# Patient Record
Sex: FEMALE | Race: WHITE | Employment: FULL TIME | URBAN - METROPOLITAN AREA
[De-identification: names, ages, dates, MRNs, and addresses within clinical notes are randomized per-mention and may not be internally consistent; named-entity substitution may affect disease eponyms.]

---

## 2017-06-06 ENCOUNTER — ALLSCRIPTS OFFICE VISIT (OUTPATIENT)
Dept: OTHER | Facility: OTHER | Age: 37
End: 2017-06-06

## 2017-06-06 DIAGNOSIS — J45.20 MILD INTERMITTENT ASTHMA, UNCOMPLICATED: ICD-10-CM

## 2017-08-03 ENCOUNTER — HOSPITAL ENCOUNTER (OUTPATIENT)
Dept: RADIOLOGY | Facility: HOSPITAL | Age: 37
Discharge: HOME | End: 2017-08-03
Attending: FAMILY MEDICINE
Payer: COMMERCIAL

## 2017-08-03 ENCOUNTER — TRANSCRIBE ORDERS (OUTPATIENT)
Dept: ADMINISTRATIVE | Facility: HOSPITAL | Age: 37
End: 2017-08-03

## 2017-08-03 DIAGNOSIS — J45.20 MILD INTERMITTENT ASTHMA, UNCOMPLICATED: ICD-10-CM

## 2017-08-03 PROCEDURE — 71020 HB CHEST X-RAY 2VW FRONTAL&LATL: CPT

## 2017-08-08 ENCOUNTER — GENERIC CONVERSION - ENCOUNTER (OUTPATIENT)
Dept: OTHER | Facility: OTHER | Age: 37
End: 2017-08-08

## 2017-08-11 ENCOUNTER — ALLSCRIPTS OFFICE VISIT (OUTPATIENT)
Dept: OTHER | Facility: OTHER | Age: 37
End: 2017-08-11

## 2017-08-11 ENCOUNTER — TRANSCRIBE ORDERS (OUTPATIENT)
Dept: ADMINISTRATIVE | Facility: HOSPITAL | Age: 37
End: 2017-08-11

## 2017-08-11 DIAGNOSIS — R06.02 SOB (SHORTNESS OF BREATH): Primary | ICD-10-CM

## 2017-08-11 DIAGNOSIS — R05.9 COUGH: ICD-10-CM

## 2017-08-17 ENCOUNTER — TRANSCRIBE ORDERS (OUTPATIENT)
Dept: SLEEP CENTER | Facility: CLINIC | Age: 37
End: 2017-08-17

## 2017-08-17 DIAGNOSIS — J44.9 OSA AND COPD OVERLAP SYNDROME (HCC): Primary | ICD-10-CM

## 2017-08-17 DIAGNOSIS — G47.33 OSA AND COPD OVERLAP SYNDROME (HCC): Primary | ICD-10-CM

## 2017-08-18 ENCOUNTER — TRANSCRIBE ORDERS (OUTPATIENT)
Dept: SLEEP CENTER | Facility: CLINIC | Age: 37
End: 2017-08-18

## 2017-08-18 DIAGNOSIS — G47.33 OSA (OBSTRUCTIVE SLEEP APNEA): Primary | ICD-10-CM

## 2017-08-29 ENCOUNTER — GENERIC CONVERSION - ENCOUNTER (OUTPATIENT)
Dept: OTHER | Facility: OTHER | Age: 37
End: 2017-08-29

## 2017-08-29 ENCOUNTER — HOSPITAL ENCOUNTER (OUTPATIENT)
Dept: PULMONOLOGY | Facility: HOSPITAL | Age: 37
Discharge: HOME | End: 2017-08-29
Attending: INTERNAL MEDICINE
Payer: COMMERCIAL

## 2017-08-29 DIAGNOSIS — R05.9 COUGH: ICD-10-CM

## 2017-08-29 DIAGNOSIS — R06.02 SOB (SHORTNESS OF BREATH): ICD-10-CM

## 2017-08-29 PROCEDURE — 94726 PLETHYSMOGRAPHY LUNG VOLUMES: CPT

## 2017-08-29 PROCEDURE — 94729 DIFFUSING CAPACITY: CPT

## 2017-08-29 PROCEDURE — 94760 N-INVAS EAR/PLS OXIMETRY 1: CPT

## 2017-08-29 PROCEDURE — 94070 EVALUATION OF WHEEZING: CPT

## 2017-08-29 RX ORDER — ALBUTEROL SULFATE 2.5 MG/3ML
2.5 SOLUTION RESPIRATORY (INHALATION) ONCE AS NEEDED
Status: DISCONTINUED | OUTPATIENT
Start: 2017-08-29 | End: 2017-09-02 | Stop reason: HOSPADM

## 2017-08-29 RX ORDER — ALBUTEROL SULFATE 2.5 MG/3ML
2.5 SOLUTION RESPIRATORY (INHALATION) ONCE AS NEEDED
Status: COMPLETED | OUTPATIENT
Start: 2017-08-29 | End: 2017-08-29

## 2017-08-29 RX ORDER — SODIUM CHLORIDE FOR INHALATION 0.9 %
3 VIAL, NEBULIZER (ML) INHALATION ONCE AS NEEDED
Status: COMPLETED | OUTPATIENT
Start: 2017-08-29 | End: 2017-08-29

## 2017-08-29 RX ADMIN — ALBUTEROL SULFATE 2.5 MG: 2.5 SOLUTION RESPIRATORY (INHALATION) at 13:32

## 2017-08-29 RX ADMIN — Medication 3 ML: at 13:29

## 2017-08-29 RX ADMIN — METHACHOLINE CHLORIDE 5 BREATH: 100 POWDER, FOR SOLUTION RESPIRATORY (INHALATION) at 13:27

## 2017-08-30 ENCOUNTER — ALLSCRIPTS OFFICE VISIT (OUTPATIENT)
Dept: OTHER | Facility: OTHER | Age: 37
End: 2017-08-30

## 2017-08-30 ENCOUNTER — GENERIC CONVERSION - ENCOUNTER (OUTPATIENT)
Dept: OTHER | Facility: OTHER | Age: 37
End: 2017-08-30

## 2017-12-18 ENCOUNTER — HOSPITAL ENCOUNTER (OUTPATIENT)
Dept: SLEEP CENTER | Facility: CLINIC | Age: 37
Discharge: HOME | End: 2017-12-18
Payer: COMMERCIAL

## 2017-12-18 DIAGNOSIS — G47.33 OSA (OBSTRUCTIVE SLEEP APNEA): ICD-10-CM

## 2017-12-18 DIAGNOSIS — G47.33 OSA AND COPD OVERLAP SYNDROME (HCC): ICD-10-CM

## 2017-12-18 DIAGNOSIS — J44.9 OSA AND COPD OVERLAP SYNDROME (HCC): ICD-10-CM

## 2017-12-18 PROCEDURE — G0399 HOME SLEEP TEST/TYPE 3 PORTA: HCPCS

## 2017-12-20 DIAGNOSIS — R51.9 HEADACHE: ICD-10-CM

## 2017-12-20 DIAGNOSIS — G47.19 OTHER HYPERSOMNIA: ICD-10-CM

## 2017-12-20 DIAGNOSIS — E04.9 NONTOXIC GOITER: ICD-10-CM

## 2017-12-20 DIAGNOSIS — G47.39 OTHER SLEEP APNEA: ICD-10-CM

## 2017-12-20 DIAGNOSIS — G43.709 CHRONIC MIGRAINE WITHOUT AURA WITHOUT STATUS MIGRAINOSUS, NOT INTRACTABLE: ICD-10-CM

## 2017-12-20 DIAGNOSIS — E66.9 OBESITY: ICD-10-CM

## 2018-01-09 ENCOUNTER — GENERIC CONVERSION - ENCOUNTER (OUTPATIENT)
Dept: OTHER | Facility: OTHER | Age: 38
End: 2018-01-09

## 2018-01-09 ENCOUNTER — ALLSCRIPTS OFFICE VISIT (OUTPATIENT)
Dept: OTHER | Facility: OTHER | Age: 38
End: 2018-01-09

## 2018-01-09 ENCOUNTER — TRANSCRIBE ORDERS (OUTPATIENT)
Dept: ADMINISTRATIVE | Facility: HOSPITAL | Age: 38
End: 2018-01-09

## 2018-01-09 DIAGNOSIS — G47.39 MIXED SLEEP APNEA: Primary | ICD-10-CM

## 2018-01-10 ENCOUNTER — GENERIC CONVERSION - ENCOUNTER (OUTPATIENT)
Dept: OTHER | Facility: OTHER | Age: 38
End: 2018-01-10

## 2018-01-10 ENCOUNTER — TRANSCRIBE ORDERS (OUTPATIENT)
Dept: ADMINISTRATIVE | Facility: HOSPITAL | Age: 38
End: 2018-01-10

## 2018-01-10 DIAGNOSIS — G47.33 OBSTRUCTIVE SLEEP APNEA SYNDROME: Primary | ICD-10-CM

## 2018-01-10 LAB
A/G RATIO (HISTORICAL): 1.6 (ref 1.2–2.2)
ALBUMIN SERPL BCP-MCNC: 4.7 G/DL (ref 3.5–5.5)
ALP SERPL-CCNC: 114 IU/L (ref 39–117)
ALT SERPL W P-5'-P-CCNC: 25 IU/L (ref 0–32)
ANTINUCLEAR ANTIBODIES, IFA (HISTORICAL): NEGATIVE
AST SERPL W P-5'-P-CCNC: 18 IU/L (ref 0–40)
BASOPHILS # BLD AUTO: 0 %
BASOPHILS # BLD AUTO: 0 X10E3/UL (ref 0–0.2)
BILIRUB SERPL-MCNC: 0.5 MG/DL (ref 0–1.2)
BUN SERPL-MCNC: 15 MG/DL (ref 6–20)
BUN/CREA RATIO (HISTORICAL): 22 (ref 9–23)
CALCIUM SERPL-MCNC: 9.5 MG/DL (ref 8.7–10.2)
CHLORIDE SERPL-SCNC: 101 MMOL/L (ref 96–106)
CHOLEST SERPL-MCNC: 222 MG/DL (ref 100–199)
CO2 SERPL-SCNC: 20 MMOL/L (ref 18–29)
CREAT SERPL-MCNC: 0.68 MG/DL (ref 0.57–1)
DEPRECATED RDW RBC AUTO: 15 % (ref 12.3–15.4)
EGFR AFRICAN AMERICAN (HISTORICAL): 130 ML/MIN/1.73
EGFR-AMERICAN CALC (HISTORICAL): 113 ML/MIN/1.73
EOSINOPHIL # BLD AUTO: 0.2 X10E3/UL (ref 0–0.4)
EOSINOPHIL # BLD AUTO: 2 %
ERYTHROCYTE SEDIMENTATION RATE (HISTORICAL): 45 MM/HR (ref 0–32)
GLUCOSE SERPL-MCNC: 96 MG/DL (ref 65–99)
HCT VFR BLD AUTO: 39.1 % (ref 34–46.6)
HDLC SERPL-MCNC: 53 MG/DL
HGB BLD-MCNC: 12.9 G/DL (ref 11.1–15.9)
IMM.GRANULOCYTES (CD4/8) (HISTORICAL): 0.1 X10E3/UL (ref 0–0.1)
IMM.GRANULOCYTES (CD4/8) (HISTORICAL): 1 %
LDLC SERPL CALC-MCNC: 122 MG/DL (ref 0–99)
LYME IGG/IGM AB (HISTORICAL): <0.91 ISR (ref 0–0.9)
LYMPHOCYTES # BLD AUTO: 2 X10E3/UL (ref 0.7–3.1)
LYMPHOCYTES # BLD AUTO: 25 %
MCH RBC QN AUTO: 27.6 PG (ref 26.6–33)
MCHC RBC AUTO-ENTMCNC: 33 G/DL (ref 31.5–35.7)
MCV RBC AUTO: 84 FL (ref 79–97)
MONOCYTES # BLD AUTO: 0.7 X10E3/UL (ref 0.1–0.9)
MONOCYTES (HISTORICAL): 8 %
NEUTROPHILS # BLD AUTO: 5 X10E3/UL (ref 1.4–7)
NEUTROPHILS # BLD AUTO: 64 %
PLATELET # BLD AUTO: 311 X10E3/UL (ref 150–379)
PLEASE NOTE (HISTORICAL): NORMAL
POTASSIUM SERPL-SCNC: 4.2 MMOL/L (ref 3.5–5.2)
RBC (HISTORICAL): 4.68 X10E6/UL (ref 3.77–5.28)
SODIUM SERPL-SCNC: 139 MMOL/L (ref 134–144)
TOT. GLOBULIN, SERUM (HISTORICAL): 3 G/DL (ref 1.5–4.5)
TOTAL PROTEIN (HISTORICAL): 7.7 G/DL (ref 6–8.5)
TRIGL SERPL-MCNC: 233 MG/DL (ref 0–149)
TSH SERPL DL<=0.05 MIU/L-ACNC: 2.75 UIU/ML (ref 0.45–4.5)
VLDLC SERPL CALC-MCNC: 47 MG/DL (ref 5–40)
WBC # BLD AUTO: 8 X10E3/UL (ref 3.4–10.8)

## 2018-01-11 ENCOUNTER — GENERIC CONVERSION - ENCOUNTER (OUTPATIENT)
Dept: OTHER | Facility: OTHER | Age: 38
End: 2018-01-11

## 2018-01-11 LAB — LEAD BLOOD (HISTORICAL): 1 UG/DL (ref 0–19)

## 2018-01-11 NOTE — MISCELLANEOUS
Message  Patient called the office earlier today to speak with some one regarding her SOB after having a Methacholine challenge performed yesterday  Patients phone call from earlier today was returned  Patient did not answer and a message was left  Signatures   Electronically signed by : CHATO Gutierrez;  Aug 30 2017  5:06PM EST                       (Author)

## 2018-01-12 ENCOUNTER — GENERIC CONVERSION - ENCOUNTER (OUTPATIENT)
Dept: OTHER | Facility: OTHER | Age: 38
End: 2018-01-12

## 2018-01-12 VITALS
HEART RATE: 82 BPM | HEIGHT: 64 IN | RESPIRATION RATE: 14 BRPM | OXYGEN SATURATION: 99 % | SYSTOLIC BLOOD PRESSURE: 128 MMHG | TEMPERATURE: 98 F | BODY MASS INDEX: 39.09 KG/M2 | WEIGHT: 229 LBS | DIASTOLIC BLOOD PRESSURE: 86 MMHG

## 2018-01-12 LAB
ADEQUACY: (HISTORICAL): NORMAL
CLINICIAN PROVIDIED ICD 9 OR 10 (HISTORICAL): NORMAL
COMMENT (HISTORICAL): NORMAL
DIAGNOSIS (HISTORICAL): NORMAL
HPV HIGH RISK (HISTORICAL): NEGATIVE
PERFORMED BY (HISTORICAL): NORMAL

## 2018-01-13 VITALS
OXYGEN SATURATION: 97 % | HEIGHT: 64 IN | BODY MASS INDEX: 38.41 KG/M2 | TEMPERATURE: 97.9 F | HEART RATE: 93 BPM | DIASTOLIC BLOOD PRESSURE: 90 MMHG | SYSTOLIC BLOOD PRESSURE: 124 MMHG | WEIGHT: 225 LBS

## 2018-01-14 VITALS
BODY MASS INDEX: 38.67 KG/M2 | OXYGEN SATURATION: 97 % | HEIGHT: 64 IN | HEART RATE: 105 BPM | SYSTOLIC BLOOD PRESSURE: 130 MMHG | TEMPERATURE: 100.2 F | WEIGHT: 226.5 LBS | DIASTOLIC BLOOD PRESSURE: 84 MMHG

## 2018-01-15 NOTE — RESULT NOTES
Discussion/Summary   Please let the patient know that her chest x-ray was normal      Verified Results  * XR CHEST PA & LATERAL [id number] 10:31AM [facility] Order Number: [id number]     Test Name Result Flag Reference   XR CHEST PA & LATERAL (Report)     CHEST DUAL ENERGY     INDICATION: Productive cough for 4 months  COMPARISON: None     VIEWS: PA (including soft tissue and bone algorithms) and lateral projections; 4 images     FINDINGS:        Cardiomediastinal silhouette appears unremarkable  The lungs are clear  No pneumothorax or pleural effusion  Visualized osseous structures appear within normal limits for the patient's age  IMPRESSION:     No active pulmonary disease  Workstation performed: [id number]     Signed by:    Ady Hernadnez MD   8/8/17

## 2018-01-18 NOTE — MISCELLANEOUS
Message   Date: 30 Aug 2017 2:09 PM EST, Recorded By: Brittney Rodriguez For: Nevaeh Dumas: Lenore Rudd, Self   Phone: [phone] (Home)   Reason: General Medical Question   PT Called here to let us know she had her PFT done and wanted results i told her results will take between 5 to 7 days for results she than said she feels more SOB i told her she should call her family Dr  and let them be aware  she is aware that Dr Babs Prabhakar is unavailable this week til 9/5/17        Active Problems    1  Allergic rhinitis (477 9) (J30 9)   2  Chronic migraine without aura without status migrainosus, not intractable (346 70)   (G43 709)   3  Cough (786 2) (R05)   4  Eczema (692 9) (L30 9)   5  Excessive daytime sleepiness (780 54) (G47 19)   6  Menstrual migraine without status migrainosus, not intractable (346 40) (G43 829)   7  Mild intermittent reactive airway disease without complication (091 31) (D06 08)   8  Obesity (BMI 30-39 9) (278 00) (E66 9)   9  Shortness of breath (786 05) (R06 02)   10  Sleep apnea with hypersomnolence (780 53) (G47 10,G47 30)   11  Snoring (786 09) (R06 83)   12  Thyromegaly (240 9) (E04 9)    Current Meds   1  Butalbital-APAP-Caffeine -40 MG Oral Capsule; TAKE 1 CAPSULE Every 4 hours   PRN severe migraine; Therapy: 25MSJ8427 to (Evaluate:08Jun2017); Last Rx:06Jun2017 Ordered   2  Fluticasone Propionate 50 MCG/ACT Nasal Suspension; USE TWO SPRAYS EACH   NOSTRIL DAILY; Therapy: 11Aug2017 to (Evaluate:56Tez9746); Last Rx:11Aug2017 Ordered    Allergies    1  No Known Food Allergies    2   No Known Allergies    Signatures   Electronically signed by : Sophie Chaudhari, ; Aug 30 2017  2:14PM EST                       (Author)

## 2018-01-22 VITALS
WEIGHT: 242 LBS | BODY MASS INDEX: 41.32 KG/M2 | TEMPERATURE: 98.4 F | SYSTOLIC BLOOD PRESSURE: 126 MMHG | DIASTOLIC BLOOD PRESSURE: 98 MMHG | HEART RATE: 92 BPM | HEIGHT: 64 IN | OXYGEN SATURATION: 97 %

## 2018-01-23 NOTE — RESULT NOTES
Verified Results  (1) CBC/PLT/DIFF [id number] 12:00AM [facility]     Test Name Result Flag Reference   WBC 8 0 x10E3/uL  3 4-10 8   RBC 4 68 x10E6/uL  3 77-5 28   Hemoglobin 12 9 g/dL  11 1-15 9   Hematocrit 39 1 %  34 0-46  6   MCV 84 fL  79-97   MCH 27 6 pg  26 6-33 0   MCHC 33 0 g/dL  31 5-35 7   RDW 15 0 %  12 3-15 4   Platelets 003 N09T6/HD  150-379   Neutrophils 64 %  Not Estab  Lymphs 25 %  Not Estab  Monocytes 8 %  Not Estab  Eos 2 %  Not Estab  Basos 0 %  Not Estab  Neutrophils (Absolute) 5 0 x10E3/uL  1 4-7 0   Lymphs (Absolute) 2 0 x10E3/uL  0 7-3 1   Monocytes(Absolute) 0 7 x10E3/uL  0 1-0 9   Eos (Absolute) 0 2 x10E3/uL  0 0-0 4   Baso (Absolute) 0 0 x10E3/uL  0 0-0 2   Immature Granulocytes 1 %  Not Estab  Immature Grans (Abs) 0 1 x10E3/uL  0 0-0 1     (LC) Please note [id number] 12:00AM [facility]     Test Name Result Flag Reference   Please note      The date and/or time of collection was not indicated on the  requisition as required by state and federal law  The date of  receipt of the specimen was used as the collection date if not  supplied  The date and/or time of collection was not indicated on the  requisition as required by state and federal law  The date of  receipt of the specimen was used as the collection date if not  supplied       (LC) TSH reflex to T4F [id number] 12:00AM [facility]     Test Name Result Flag Reference   TSH 2 750 uIU/mL  0 450-4 500       Plan  Mixed sleep apnea    · *[facility]  Status: Active  Requested for:  [id number]  Care Summary provided  : Yes

## 2018-01-23 NOTE — MISCELLANEOUS
Message  Called patient, discussed the results of her sleep study  Patient was made aware that she needs to have a second sleep study done  Appointment was made for the patient and she was made aware of time and date  Active Problems    1  Allergic rhinitis (477 9) (J30 9)   2  Bronchitis, acute (466 0) (J20 9)   3  Chronic headache (784 0) (R51)   4  Chronic migraine without aura without status migrainosus, not intractable (346 70)   (G43 709)   5  Cough (786 2) (R05)   6  Eczema (692 9) (L30 9)   7  Encounter for gynecological examination without abnormal finding (V72 31) (Z01 419)   8  Excessive daytime sleepiness (780 54) (G47 19)   9  IUD (intrauterine device) in place (V45 51) (Z97 5)   10  Menstrual migraine without status migrainosus, not intractable (346 40) (G43 829)   11  Mild intermittent reactive airway disease without complication (979 17) (K43 62)   12  Mixed sleep apnea (327 29) (G47 39)   13  Nausea (787 02) (R11 0)   14  Obesity (BMI 30-39 9) (278 00) (E66 9)   15  JOSH (obstructive sleep apnea) (327 23) (G47 33)   16  Screening for endocrine disorder (V77 99) (Z13 29)   17  Screening for lead exposure (V82 5) (Z13 88)   18  Screening, anemia, deficiency, iron (V78 0) (Z13 0)   19  Sleep apnea with hypersomnolence (780 53) (G47 10,G47 30)   20  Snoring (786 09) (R06 83)   21  Thyroid disorder screen (V77 0) (Z13 29)   22  Thyromegaly (240 9) (E04 9)    Current Meds   1  Butalbital-APAP-Caffeine -40 MG Oral Capsule; TAKE 1 CAPSULE Every 4 hours   PRN severe migraine; Therapy: 57MJB7432 to (Evaluate:08Jun2017); Last Rx:06Jun2017 Ordered   2  Fluticasone Propionate 50 MCG/ACT Nasal Suspension; USE TWO SPRAYS EACH   NOSTRIL DAILY; Therapy: 11Aug2017 to (Tomas Ashton)  Requested for: 03GFO7999; Last   Rx:09Jan2018 Ordered   3  ProAir  (90 Base) MCG/ACT Inhalation Aerosol Solution; INHALE 2 PUFFS   EVERY 4 HOURS AS NEEDED;    Therapy: 50Bko5482 to (Last Rx:51Gzh8498) Requested for: 88Amg0352 Ordered    Allergies    1  No Known Food Allergies    2   No Known Allergies    Signatures   Electronically signed by : Mirtha Hernandez, ; Keith 10 2018 10:36AM EST                       (Author)

## 2018-01-30 ENCOUNTER — HOSPITAL ENCOUNTER (OUTPATIENT)
Dept: NON INVASIVE DIAGNOSTICS | Facility: HOSPITAL | Age: 38
Discharge: HOME | End: 2018-01-30
Attending: FAMILY MEDICINE
Payer: COMMERCIAL

## 2018-01-30 DIAGNOSIS — G47.39 OTHER SLEEP APNEA: ICD-10-CM

## 2018-01-30 PROCEDURE — 93306 TTE W/DOPPLER COMPLETE: CPT | Performed by: INTERNAL MEDICINE

## 2018-01-30 PROCEDURE — 93306 TTE W/DOPPLER COMPLETE: CPT

## 2018-02-19 ENCOUNTER — HOSPITAL ENCOUNTER (OUTPATIENT)
Dept: SLEEP CENTER | Facility: HOSPITAL | Age: 38
Discharge: HOME | End: 2018-02-19
Attending: INTERNAL MEDICINE
Payer: COMMERCIAL

## 2018-02-19 DIAGNOSIS — G47.33 OBSTRUCTIVE SLEEP APNEA SYNDROME: ICD-10-CM

## 2018-02-19 DIAGNOSIS — G47.31 CSA (CENTRAL SLEEP APNEA): ICD-10-CM

## 2018-02-19 PROCEDURE — 95811 POLYSOM 6/>YRS CPAP 4/> PARM: CPT

## 2018-02-19 PROCEDURE — 95811 POLYSOM 6/>YRS CPAP 4/> PARM: CPT | Performed by: INTERNAL MEDICINE

## 2018-02-20 NOTE — PROGRESS NOTES
Sleep Study Documentation    Pre-Sleep Study       Sleep testing procedure explained to patient:YES    Patient napped prior to study:NO    Caffeine:Dayshift worker after 12PM   Caffeine use:YES- coffee  6 ounces    Alcohol:Dayshift workers after 5PM: Alcohol use:NO    Typical day for patient:YES       Study Documentation  Treatment   Optimal PAP pressure: none  Leak:Medium  Snore:Eliminated  REM Obtained:yes  Supplemental O2: no    Minimum SaO2 85  Baseline SaO2 98  PAP mask tried (list all)RESMED AIRFIT F10 AND MIRAGE FX*  PAP mask choice (final)RESMED MIRAGE FX  PAP pressure at which snoring was eliminated 6  Minimum SaO2 at final PAP pressure no optimal pressure  Mode of Therapy:CPAP  ETCO2:No  CPAP changed to BiPAP:Yes  If yes why CSA    Mode of Therapy:BiPAP    EKG abnormalities: yes If yes, EPOCH example and comments:SINUS TACH;PVC'S     EEG abnormalities: no If yes, EPOCH example and comments    Study Terminated:no      Post-Sleep Study    Medication used at bedtime or during sleep study:YES other prescription medications    Patient reports time it took to fall asleep:greater than 60 minutes    Patient reports waking up during study:1 to 2 times  Patient reports returning to sleep in 10 to 30 minutes  Patient reports sleeping 2 to 4 hours without dreaming  Patient reports sleep during study:better than usual    Patient rated sleepiness: Somewhat sleepy or tired    PAP treatment:yes: Post PAP treatment patient reports feeling better and  would wear PAP mask at home

## 2018-02-22 DIAGNOSIS — G47.31 COMPLEX SLEEP APNEA SYNDROME: ICD-10-CM

## 2018-02-22 DIAGNOSIS — G47.33 OSA (OBSTRUCTIVE SLEEP APNEA): Primary | ICD-10-CM

## 2018-02-22 DIAGNOSIS — G47.31 CSA (CENTRAL SLEEP APNEA): ICD-10-CM

## 2018-02-26 ENCOUNTER — TELEPHONE (OUTPATIENT)
Dept: SLEEP CENTER | Facility: CLINIC | Age: 38
End: 2018-02-26

## 2018-02-26 NOTE — RESULT NOTES
Discussion/Summary   Please let the patient know that all of her labs are coming back normal-her triglycerides are high and they have been in the past so this is something we should watch carefully  She should be very cautious about her diet and eat a low-cholesterol, low-fat diet  I do think many of her problems are related to her sleep apnea so the next step would be for her to follow up as we discussed at her visit       Verified Results  (1) COMPREHENSIVE METABOLIC PANEL [id number] 65:93MB Margie Clarke     Test Name Result Flag Reference   Glucose, Serum 96 mg/dL  65-99   BUN 15 mg/dL  6-20   Creatinine, Serum 0 68 mg/dL  0 57-1 00   BUN/Creatinine Ratio 22  9-23   Sodium, Serum 139 mmol/L  134-144   Potassium, Serum 4 2 mmol/L  3 5-5 2   Chloride, Serum 101 mmol/L     Carbon Dioxide, Total 20 mmol/L  18-29   Calcium, Serum 9 5 mg/dL  8 7-10 2   Protein, Total, Serum 7 7 g/dL  6 0-8 5   Albumin, Serum 4 7 g/dL  3 5-5 5   Globulin, Total 3 0 g/dL  1 5-4 5   A/G Ratio 1 6  1 2-2 2   Bilirubin, Total 0 5 mg/dL  0 0-1 2   Alkaline Phosphatase, S 114 IU/L     AST (SGOT) 18 IU/L  0-40   ALT (SGPT) 25 IU/L  0-32   eGFR If NonAfricn Am 113 mL/min/1 73  >59   eGFR If Africn Am 130 mL/min/1 73  >59

## 2018-02-26 NOTE — RESULT NOTES
Verified Results  ([facility] [facility]) Lead, Blood (Adult) [id number] 12:00AM Flakito Fernandez     Test Name Result Flag Reference   Lead, Blood (Adult) 1 ug/dL  0-19   Analysis by atomic absorption spectroscopy (AAS)                                             Environmental Exposure:                                            WHO Recommendation    <20                                           Occupational Exposure:                                            OSHA Lead Std          40                                            DUARTE                    30                                                 Detection Limit =  1

## 2018-03-02 ENCOUNTER — TELEPHONE (OUTPATIENT)
Dept: PULMONOLOGY | Facility: CLINIC | Age: 38
End: 2018-03-02

## 2018-03-02 NOTE — TELEPHONE ENCOUNTER
Pt is calling stating that based on sleep study results they are recommending a cpap machine  Can you please start the process because she is going away next Thursday    Her tel [phone]

## 2018-03-08 ENCOUNTER — TELEPHONE (OUTPATIENT)
Dept: PULMONOLOGY | Facility: HOSPITAL | Age: 38
End: 2018-03-08

## 2018-03-08 NOTE — TELEPHONE ENCOUNTER
Called patient left message informing her that CPAP machine was faxed to [facility]

## 2018-04-02 ENCOUNTER — TELEPHONE (OUTPATIENT)
Dept: PULMONOLOGY | Facility: CLINIC | Age: 38
End: 2018-04-02

## 2018-04-02 NOTE — TELEPHONE ENCOUNTER
Pt called stating someone from office had called her to let her know, Dr Jaylen Doll wanted her to do further testing before getting cpap machine  Pt does not want to do anymore testings  She would like to get cpap machine so she can get a good night sleep  Please call her back

## 2018-04-03 NOTE — TELEPHONE ENCOUNTER
Per Dr Traci Vega report on her sleep study, patient should go for another sleep study, but patient does not wish to proceed with any more testing she just wants to try using the machine  Please advise

## 2018-04-04 ENCOUNTER — TELEPHONE (OUTPATIENT)
Dept: PULMONOLOGY | Facility: CLINIC | Age: 38
End: 2018-04-04

## 2018-04-04 NOTE — TELEPHONE ENCOUNTER
Patient had called stating she does not understand why she needs to go for another sleep study  She had originally called the office stating that she read the results of her sleep study and from her understanding she was going to get a CPAP machine at that time she was told that everything would be processed  I then called her back and told her that according to her sleep study report she would need to go back for another sleep study  An auto SV study because she failed both CPAP and BiPAP  I spoke with her in depth about this and she does not want to proceed with anymore testing she states that she "just can't do it"  She states she did not sleep well, and she can not put herself through this again  I explained to her that I would speak with Dr Marta Smith to see how she would like to proceed

## 2018-04-11 ENCOUNTER — TRANSCRIBE ORDERS (OUTPATIENT)
Dept: FAMILY MEDICINE CLINIC | Facility: CLINIC | Age: 38
End: 2018-04-11

## 2018-04-11 DIAGNOSIS — G47.33 OBSTRUCTIVE SLEEP APNEA: Primary | ICD-10-CM

## 2018-05-08 ENCOUNTER — OFFICE VISIT (OUTPATIENT)
Dept: FAMILY MEDICINE CLINIC | Facility: CLINIC | Age: 38
End: 2018-05-08
Payer: COMMERCIAL

## 2018-05-08 VITALS
HEART RATE: 80 BPM | BODY MASS INDEX: 44.09 KG/M2 | TEMPERATURE: 98.4 F | OXYGEN SATURATION: 98 % | HEIGHT: 64 IN | SYSTOLIC BLOOD PRESSURE: 98 MMHG | DIASTOLIC BLOOD PRESSURE: 64 MMHG | WEIGHT: 258.25 LBS

## 2018-05-08 DIAGNOSIS — N91.2 AMENORRHEA: ICD-10-CM

## 2018-05-08 DIAGNOSIS — G47.39 MIXED SLEEP APNEA: ICD-10-CM

## 2018-05-08 DIAGNOSIS — G47.10 SLEEP APNEA WITH HYPERSOMNOLENCE: ICD-10-CM

## 2018-05-08 DIAGNOSIS — E66.9 OBESITY (BMI 30-39.9): ICD-10-CM

## 2018-05-08 DIAGNOSIS — I87.2 VENOUS INSUFFICIENCY: Primary | ICD-10-CM

## 2018-05-08 DIAGNOSIS — N92.6 IRREGULAR PERIODS/MENSTRUAL CYCLES: ICD-10-CM

## 2018-05-08 DIAGNOSIS — G47.30 SLEEP APNEA WITH HYPERSOMNOLENCE: ICD-10-CM

## 2018-05-08 PROBLEM — J30.9 ALLERGIC RHINITIS: Status: ACTIVE | Noted: 2017-08-11

## 2018-05-08 PROBLEM — G47.19 EXCESSIVE DAYTIME SLEEPINESS: Status: ACTIVE | Noted: 2017-06-06

## 2018-05-08 PROBLEM — J45.909 REACTIVE AIRWAY DISEASE: Status: ACTIVE | Noted: 2017-06-06

## 2018-05-08 PROBLEM — G43.709 CHRONIC MIGRAINE WITHOUT AURA WITHOUT STATUS MIGRAINOSUS, NOT INTRACTABLE: Status: ACTIVE | Noted: 2017-06-06

## 2018-05-08 PROCEDURE — 99214 OFFICE O/P EST MOD 30 MIN: CPT | Performed by: FAMILY MEDICINE

## 2018-05-08 RX ORDER — FLUTICASONE PROPIONATE 50 MCG
2 SPRAY, SUSPENSION (ML) NASAL DAILY
COMMUNITY
Start: 2017-08-11

## 2018-05-08 RX ORDER — ALBUTEROL SULFATE 90 UG/1
2 AEROSOL, METERED RESPIRATORY (INHALATION) EVERY 4 HOURS PRN
COMMUNITY
Start: 2017-08-30

## 2018-05-08 RX ORDER — BUTALBITAL, ACETAMINOPHEN AND CAFFEINE 300; 40; 50 MG/1; MG/1; MG/1
CAPSULE ORAL EVERY 4 HOURS
COMMUNITY
Start: 2017-06-06

## 2018-05-08 RX ORDER — ONDANSETRON 4 MG/1
1 TABLET, ORALLY DISINTEGRATING ORAL EVERY 8 HOURS PRN
COMMUNITY
Start: 2017-08-30 | End: 2018-07-16

## 2018-05-08 NOTE — PROGRESS NOTES
Assessment/Plan:    No problem-specific Assessment & Plan notes found for this encounter  Diagnoses and all orders for this visit:    Venous insufficiency  -     Compression Stocking    We discussed the diagnosis of venous insufficiency and I prescribed compression stockings for the patient  I advised that she take the prescription to a medical supply store and ask that she be custom fit as these will be better for her in the long run  While she does not need to wear these every days she certainly should wear them when she know she is going to be on her feet for an extended period of time  She is a  so she often stand on her feet and if she does a double, as she did last weekend, she should absolutely wear them  Irregular periods/menstrual cycles  Amenorrhea  -     US pelvis complete non OB; Future    Given the new onset of irregular periods after removal of her IUD I am going to get pelvic ultrasound  I do not suspect that there is anything specifically wrong but the patient has now become exceedingly worried about cancer given her mother's recent death  Her irregular periods may very well be related to the stress around this but we will do an ultrasound to be sure  Sleep apnea with hypersomnolence  Mixed sleep apnea    I advised the patient to keep the appointment with Dr Taty Chao and discuss all of her concerns with him  This does definitely need to be treated and I am sure he can give her better explanations and answers that I can  I advised that I will also should him a message prior to her visit with him to give him a heads up about her concerns  Obesity (BMI 30-39  9)    I am sure the obesity is contributing to all 3 of these things-her irregular periods, her venous insufficiency, and her sleep apnea  She is aware that she is obese but has little time to exercise  It may be worth discussing surgical options in the future that we did not discuss this today                   Subjective: Patient ID: Huong Christianson is a 41 y o  female  The patient is here today to discuss a couple of different things    1  Irregular periods  She had her IUD removed, by me, in January of 2018  She had a ParaGard and had been having monthly heavy, crampy periods  Her  got a vasectomy and after it was proven to be effective we removed the IUD  Since the removal she has had 1 normal   It has now been 61 days since she had any kind of bleeding at all  She has not had any cramping at all either    2  Mixed sleep apnea  She has had 3 different sleep studies now and nothing has really helped, per her report  She has an appointment with Dr Juan M Panda on 5/17/2018 to discuss everything  She is very frustrated, she has tried to make phone calls and communicate about what is going on and ask some questions and has not felt that she has been responded to an appropriate amount of time  She is not sure why they want her to do another sleep study  She falls asleep sitting on the toilet, standing up in the middle the night, falls over because of this  She is definitely having issues related to it  Her dad has a significant history of heart disease as well and she is very concerned about her risk of heart disease  She is aware that treating the sleep apnea is important for her heart  She is very frustrated at this point    3    Edema  Last week and she worked to double shifts and by the end of the weekend her lower extremities were extremely swollen  They were painful  After elevating her legs for a significant amount of time the swelling has gone down  She has had minimal swelling in the past but nothing this bad  She has never worn compression stockings    Otherwise she denies any chest pain or shortness of breath  She denies any fevers or chills  She denies any waking her weight loss that is unexpected    In February she lost her mother, suddenly  They took her to be evaluated for a very large hernia that she had had for years and it turned out that she had diffuse metastatic cancer through her entire body  She ended up dying 5 days later  There is a very strong family history of cancer on her mother side of the family  Her mother basically never went to the doctor, the patient says that she had not been to the doctor since the patient was born  Both of her mother's parents also  from diffuse cancer        The following portions of the patient's history were reviewed and updated as appropriate: allergies, current medications, past family history, past medical history, past social history, past surgical history and problem list     Review of Systems      Objective:  Vitals:    18 0832   BP: 98/64   Pulse: 80   Temp: 98 4 °F (36 9 °C)   SpO2: 98%   Weight: 117 kg (258 lb 4 oz)   Height: 5' 4" (1 626 m)      Physical Exam   Constitutional: She is oriented to person, place, and time  She appears well-developed and well-nourished  No distress  HENT:   Head: Normocephalic and atraumatic  Eyes: Conjunctivae are normal    Neck: Normal range of motion  Neck supple  Cardiovascular: Normal rate  Pulmonary/Chest: Effort normal    Musculoskeletal: She exhibits no edema  Neurological: She is alert and oriented to person, place, and time  Skin: Skin is warm and dry  No rash noted  She is not diaphoretic  No erythema  Psychiatric: Her affect is blunt  She exhibits a depressed mood

## 2018-05-08 NOTE — PATIENT INSTRUCTIONS
Venous Insufficiency   AMBULATORY CARE:   Venous insufficiency  is a condition that prevents blood from flowing out of your legs and back to your heart  Veins contain valves that help blood flow in one direction  Venous insufficiency means the valves do not close correctly or fully  Blood flows back and pools in your leg  This can cause problems such as varicose veins  Venous insufficiency may also be called chronic venous insufficiency or venous stasis  Common signs and symptoms:   · Visible veins on your legs that may be small and red or large, thick, and blue    · Swelling in your ankles or calves    · Changes in skin color, such as dark or purple skin    · An ulcer (open sore) on your leg    · Leg pain that is worse when you are menstruating (women) or when you stand, and better when you elevate your legs    · Burning or itching    · Cramps that happen at night    · Thick, hard skin on your legs and ankles    · Feeling of heaviness in your legs  Seek care immediately if:   · You have a wound that does not heal or is infected  · You have an injury that has broken your skin and caused your varicose veins to bleed  · Your leg is swollen and hard  · You have pain in your leg that does not go away or gets worse  · Your legs or feet are turning blue or black  · Your leg feels warm, tender, and painful  It may look swollen and red  Contact your healthcare provider if:   · You have a fever  · You have varicose veins and they are painful  · You have new or worsening leg pain, swelling, or redness  · You have new or worsening ulcers or other sores on your leg  · You have questions or concerns about your condition or care  Treatment  may include any of the following:  · Medicine  may be given to improve blood flow  The medicines may thin your blood or reduce swelling to help blood flow  You may also need medicine to treat a bacterial infection      · Ablation  is a procedure used to close varicose veins  A catheter is guided until it is near the vein  A device will then be guided to the area  The device may produce energy through radiofrequency or a laser  The energy creates heat that will close the blood vessel  · Sclerotherapy  is a procedure used to fade visible veins  Your healthcare provider will inject a liquid into a spider vein or varicose vein  The liquid causes irritation in the vein  The vein swells and sticks together  Your body will then absorb the vein  · Surgery  may be needed if other treatments do not work  Surgery may be used to repair a leg vein valve or to clip or tie off a vein so blood cannot flow through it  You may need to have a veins removed during surgery called stripping  Surgery may be used to bypass (go around) the damaged vein  Blood will flow through a vein transplanted from another part of your body  Manage your symptoms:   · Wear pressure stockings as directed  Pressure stockings help keep blood from pooling in your leg veins  Your healthcare provider can prescribe stockings that are right for you  Do not buy over-the-counter pressure stockings unless your healthcare provider says it is okay  They may not fit correctly or may have elastic that cuts off your circulation  Ask your healthcare provider when to start wearing pressure stockings and how long to wear them each day  · Do not sit or stand for long periods of time  If you have to sit for a long time, flex and extend your legs, feet, and ankles  Do this about 10 times every 30 minutes to help keep blood flowing  If you have to stand for a long time, take breaks and sit with your legs elevated  · Elevate your legs  Elevate your legs above the level of your heart to reduce swelling  Your healthcare provider may recommend that you keep your legs elevated for 30 minutes at a time  You may need to do this 3 to 4 times per day, or more if your healthcare provider recommends  · Do not smoke  Nicotine and other chemicals in cigarettes and cigars can cause blood vessel damage  Ask your healthcare provider for information if you currently smoke and need help to quit  E-cigarettes or smokeless tobacco still contain nicotine  Talk to your healthcare provider before you use these products  · Reach or maintain a healthy weight  Extra weight can make venous insufficiency worse  Ask your healthcare provider how much you should weigh  He can help you create a weight loss plan if you need to lose weight  · Exercise as directed  Walking can help increase blood flow in your calves  Ask your healthcare provider how much exercise you need each day and which exercises are best for you  · Care for your skin  Keep your skin clean  Do not use any soaps or lotions that may dry your skin  For example, do not use products that contain fragrance or alcohol  If you have a skin ulcer, your healthcare provider may recommend a wet-to-dry bandage  To do this, apply a wet bandage to your wound and allow it to dry  This will help remove drainage from your wound each time you change the bandage  Your healthcare provider will tell you how often to change your bandage and which kind of bandage to use  Check your wound for signs of infection, such as swelling or pus  · Go to physical therapy (PT) as directed  A physical therapist can help you increase movement and range of motion in your legs  Follow up with your healthcare provider as directed:  Write down your questions so you remember to ask them during your visits  © 2017 [employer] [employer] Information is for End User's use only and may not be sold, redistributed or otherwise used for commercial purposes  All illustrations and images included in CareNotes® are the copyrighted property of A D A M [employer] Inc  or Constantine Contreras  The above information is an  only   It is not intended as medical advice for individual conditions or treatments  Talk to your doctor, nurse or pharmacist before following any medical regimen to see if it is safe and effective for you

## 2018-05-14 ENCOUNTER — HOSPITAL ENCOUNTER (OUTPATIENT)
Dept: ULTRASOUND IMAGING | Facility: HOSPITAL | Age: 38
Discharge: HOME | End: 2018-05-14
Attending: FAMILY MEDICINE
Payer: COMMERCIAL

## 2018-05-14 DIAGNOSIS — N92.6 IRREGULAR PERIODS/MENSTRUAL CYCLES: ICD-10-CM

## 2018-05-14 PROCEDURE — 76830 TRANSVAGINAL US NON-OB: CPT

## 2018-05-14 PROCEDURE — 76856 US EXAM PELVIC COMPLETE: CPT

## 2018-05-17 ENCOUNTER — OFFICE VISIT (OUTPATIENT)
Dept: PULMONOLOGY | Facility: CLINIC | Age: 38
End: 2018-05-17
Payer: COMMERCIAL

## 2018-05-17 VITALS
BODY MASS INDEX: 44.47 KG/M2 | OXYGEN SATURATION: 98 % | TEMPERATURE: 97.3 F | DIASTOLIC BLOOD PRESSURE: 88 MMHG | WEIGHT: 260.5 LBS | SYSTOLIC BLOOD PRESSURE: 142 MMHG | HEART RATE: 92 BPM | HEIGHT: 64 IN

## 2018-05-17 DIAGNOSIS — G47.33 OBSTRUCTIVE SLEEP APNEA: ICD-10-CM

## 2018-05-17 PROCEDURE — 99215 OFFICE O/P EST HI 40 MIN: CPT | Performed by: INTERNAL MEDICINE

## 2018-05-17 NOTE — LETTER
May 17, 2018     Christopher Zafar, [address] [facility] [address]    Patient: Arnaud Chung   YOB: 1980   Date of Visit: 5/17/2018       Dear Dr Martita Ocasio: Thank you for referring Nathan Alfonso to me for evaluation  Below are my notes for this consultation  If you have questions, please do not hesitate to call me  I look forward to following your patient along with you  Sincerely,        Amira Linqduist DO        CC: No Recipients  Amira Lindquist DO  5/17/2018 12:41 PM  Sign at close encounter  Sleep Consultation   Arnaud Chung 41 y o  female MRN: [id number]      Reason for consultation: Treatment of JOSH    Requesting physician: Dr Oropeza Letters    Assessment/Plan  42 y/o F with PMHx of complex sleep apnea who comes in to help manage sleep issues  1   Complex sleep apnea -  Treatment emergent central events without remedy with CPAP or BIPAP during titration          -  I explained to her in depth that her sleep apnea is complicated and she has treatment emergent central events that are present  I explained that the only way we could treat this is with adaptive servoventilation  She is understanding now and is willing to undergo ASV titration study  [place] soon as possible in [place]  2   Insufficient sleep - she is averaging only 5 hours of sleep on a good day due to her profession and her children  I explained to her that this is a big reason why she is so sleepy and has symptoms of hypnagogic hallucinations  This is also likely the cause for early REM onset on her sleep study  -  I encouraged her to optimize her sleep opportunities as much as possible  3   Early REM onset on sleep study -  While this is most likely related to profound sleep deprivation from #1 and 2  I can not exclude narcolepsy    She does have symptoms of hypnagogic hallucinations but denies paralysis or cataplexy      -  If daytime sleepiness persists after adequate treatment of complex sleep apnea and insufficient sleep, we can consider MSLT  4   Gas trapping noted on PFTs/chronic cough - managed by Dr Nagel Grade  History of Present Illness   HPI:  Jackie Calloway is a 41 y o  female with PMHx as below who comes in to discuss her sleep studies  She has noted significant weight gain and difficulty staying asleep, snoring, excessive daytime sleepiness with an [place] score of 18, witnessed apneas, morning headaches and migraiens  In addition she has periods of sleep walking and she has profound sleep deprivation such that she will fall asleep on the toilet and fall on occasion  She has had falls on end side tables  She also has noted periods of hypnagogic hallucinations  She even mentioned feeling like she is in a fog like she is drunk  she will occasionally note symptoms of restless legs  she denies symptoms of cataplexy, sleep paralysis  Sleep History:  she goes to bed at approximately 12-1am, will get to sleep in seconds, will get out of bed at 6:30 am   she will get up several times at night to go to the bathroom or for unknown reasons  she does not nap during he day as she is at work  But she will fall asleep unintentionally at times        Follow in jerod    Review of Systems    Genitourinary need to urinate more than twice a night   Cardiology palpitations/fluttering feeling in the chest and ankle/leg swelling   Gastrointestinal none   Neurology frequent headaches, forgetfulness, loss of consciousness/blacking out and balance problems   Constitutional fatigue   Integumentary none   Psychiatry anxiety and depression   Musculoskeletal none   Pulmonary shortness of breath with activity, wheezing and snoring   ENT none   Endocrine none   Hematological none     Historical Information   Past Medical History:   Diagnosis Date    Asthma      Past Surgical History:   Procedure Laterality Date     SECTION  2010 Family History   Problem Relation Age of Onset    Cancer Mother     Heart disease Father      Social History     Social History    Marital status: Single     Spouse name: N/A    Number of children: N/A    Years of education: N/A     Occupational History    Not on file  Social History Main Topics    Smoking status: Current Every Day Smoker     Types: Cigarettes    Smokeless tobacco: Never Used      Comment: socially    Alcohol use No    Drug use: No    Sexual activity: Not on file     Other Topics Concern    Not on file     Social History Narrative    No narrative on file       Occupational History:     Meds/Allergies   No Known Allergies    Home medications:  Prior to Admission medications    Medication Sig Start Date End Date Taking? Authorizing Provider   albuterol (PROAIR HFA) 90 mcg/act inhaler Inhale 2 puffs every 4 (four) hours as needed 8/30/17  Yes Historical Provider, MD   Butalbital-APAP-Caffeine -40 MG CAPS Take by mouth every 4 (four) hours 6/6/17  Yes Historical Provider, MD   fluticasone (FLONASE) 50 mcg/act nasal spray 2 sprays into each nostril daily 8/11/17  Yes Historical Provider, MD   ondansetron (ZOFRAN-ODT) 4 mg disintegrating tablet Take 1 tablet by mouth every 8 (eight) hours as needed 8/30/17  Yes Historical Provider, MD       Vitals:   Blood pressure 142/88, pulse 92, temperature (!) 97 3 °F (36 3 °C), temperature source Tympanic, height 5' 4" (1 626 m), weight 118 kg (260 lb 8 oz), SpO2 98 % , RA, Body mass index is 44 71 kg/m²         Physical Exam  General: Obese, Awake alert and oriented x 3, conversant without conversational dyspnea, NAD, normal affect, somnolent  HEENT:  PERRL, Sclera noninjected, nonicteric OU, Nares patent,  no craniofacial abnormalities, Mucous membranes, moist, no oral lesions, normal dentition, Mallampati class 4  NECK: Trachea midline, no accessory muscle use, no stridor, no cervical or supraclavicular adenopathy, JVP not elevated  CARDIAC: Reg, single s1/S2, no m/r/g  PULM: CTA bilaterally no wheezing, rhonchi or rales  ABD: Normoactive bowel sounds, soft nontender, nondistended, no rebound, no rigidity, no guarding  EXT: No cyanosis, no clubbing, no edema, normal capillary refill  NEURO: no focal neurologic deficits, AAOx3, moving all extremities appropriately    Labs: I have personally reviewed pertinent lab results  Lab Results   Component Value Date    WBC 8 0 01/09/2018    HGB 12 9 01/09/2018    HCT 39 1 01/09/2018    MCV 84 01/09/2018     01/09/2018      Lab Results   Component Value Date    GLUCOSE 96 01/09/2018    CALCIUM 9 5 01/09/2018     01/09/2018    K 4 2 01/09/2018    CO2 20 01/09/2018     01/09/2018    BUN 15 01/09/2018    CREATININE 0 68 01/09/2018     Sleep studies:  Home study:     Mrs Ciera Rolon had sleep related respiratory events, AHI  113 7, more of which were obstructive  This would be consistent with severe obstructive sleep apnea  However, there were several central or mixed events as well which may represent complex sleep apnea  Therefore, I recommend an in lab CPAP titration  If central events persist or worsen, may need to consider adaptive servo ventilation if has normal systolic heart function  Titration:  Mrs Ciera Rolon slept very poorly with total sleep time of just 163 minutes and sleep efficiency of just 39 6%  However, when she did sleep she had early REM onset and increased REM density  This could have bene related to severe sleep deprivation from untreated sleep related respiratory events  Narcolepsy is very unlikely given delayed sleep onset but can not be ruled out given early REM onset  Through the titration she failed both CPAP and BIPAP with treatment emergent central events  This grew worse on BiPAP  Therefore, in conjunction with a normal EF on echocardiogram from January 2018, I recommend an ASV titration study       Stevan Goyal DO  [facility] Sleep Physician

## 2018-05-17 NOTE — PATIENT INSTRUCTIONS
Sleep Apnea   AMBULATORY CARE:   Sleep apnea  is also called obstructive sleep apnea  It is a condition that causes you to stop breathing for 10 seconds or more while you are sleeping  During normal sleep, your throat is kept open by muscles that let air pass through easily  Sleep apnea causes the muscles and tissues around your throat to relax and block air from passing through  Sleep apnea may happen many times while you are asleep  Common symptoms include the following:   · No signs of breathing for 10 seconds or more while you sleep    · Snoring loudly, snorting, gasping or choking while you sleep, and waking up suddenly because of these    · A hard time thinking, remembering things, or focusing on your tasks the following day    · Headache or nausea    · Bedwetting or waking up often during the night to urinate    · Feeling sleepy, slow, and tired during the day  Seek care immediately if:   · You have chest pain or trouble breathing  Contact your healthcare provider if:   · You feel tired or depressed  · You have trouble staying awake during the day  · You have trouble thinking clearly  · You have questions or concerns about your condition or care  Treatment for sleep apnea  includes using a continuous positive airway pressure (CPAP) machine to keep your airway open during sleep  A mask is placed over your nose and mouth, or just your nose  The mask is hooked to the CPAP machine that blows a gentle stream of air into the mask when you breathe  This helps keep your airway open so you can breathe more regularly  Extra oxygen may be given to you through the machine  You may be given a mouth device  It looks like a mouth guard or dental retainer and stops your tongue and mouth tissues from blocking your throat while you sleep  Surgery may be needed to remove extra tissues that block your mouth, throat, or nose  Manage sleep apnea:   · Do not smoke    Nicotine and other chemicals in cigarettes and cigars can cause lung damage  Ask your healthcare provider for information if you currently smoke and need help to quit  E-cigarettes or smokeless tobacco still contain nicotine  Talk to your healthcare provider before you use these products  · Do not drink alcohol or take sedative medicine before you go to sleep  Alcohol and sedatives can relax the muscles and tissues around your throat  This can block the airflow to your lungs  · Maintain a healthy weight  Excess tissue around your throat may restrict your breathing  Ask your healthcare provider for information if you need to lose weight  · Sleep on your side or use pillows designed to prevent sleep apnea  This prevents your tongue or other tissues from blocking your throat  You can also raise the head of your bed  Follow up with your healthcare provider as directed:  Write down your questions so you remember to ask them during your visits  © 2017 [address] [employer] Information is for End User's use only and may not be sold, redistributed or otherwise used for commercial purposes  All illustrations and images included in CareNotes® are the copyrighted property of A D [employer] M [employer] Inc  or [employer]re[employer]  The above information is an  only  It is not intended as medical advice for individual conditions or treatments  Talk to your doctor, nurse or pharmacist before following any medical regimen to see if it is safe and effective for you

## 2018-05-17 NOTE — PROGRESS NOTES
Sleep Consultation   Vanessa Pallas 41 y o  female MRN: [id number]      Reason for consultation: Treatment of JOSH    Requesting physician: Dr Babs Prabhakar    Assessment/Plan  42 y/o F with PMHx of complex sleep apnea who comes in to help manage sleep issues  1   Complex sleep apnea -  Treatment emergent central events without remedy with CPAP or BIPAP during titration          -  I explained to her in depth that her sleep apnea is complicated and she has treatment emergent central events that are present  I explained that the only way we could treat this is with adaptive servoventilation  She is understanding now and is willing to undergo ASV titration study  We will schedule this as soon as possible in [place]  2   Insufficient sleep - she is averaging only 5 hours of sleep on a good day due to her profession and her children  I explained to her that this is a big reason why she is so sleepy and has symptoms of hypnagogic hallucinations  This is also likely the cause for early REM onset on her sleep study  -  I encouraged her to optimize her sleep opportunities as much as possible  3   Early REM onset on sleep study -  While this is most likely related to profound sleep deprivation from #1 and 2  I can not exclude narcolepsy  She does have symptoms of hypnagogic hallucinations but denies paralysis or cataplexy      -  If daytime sleepiness persists after adequate treatment of complex sleep apnea and insufficient sleep, we can consider MSLT  4   Gas trapping noted on PFTs/chronic cough - managed by Dr Babs Prabhakar  History of Present Illness   HPI:  Vanessa Pallas is a 41 y o  female with PMHx as below who comes in to discuss her sleep studies  She has noted significant weight gain and difficulty staying asleep, snoring, excessive daytime sleepiness with an [place] score of 18, witnessed apneas, morning headaches and migraiens    In addition she has periods of sleep walking and she has profound sleep deprivation such that she will fall asleep on the toilet and fall on occasion  She has had falls on end side tables  She also has noted periods of hypnagogic hallucinations  She even mentioned feeling like she is in a fog like she is drunk  she will occasionally note symptoms of restless legs  she denies symptoms of cataplexy, sleep paralysis  Sleep History:  she goes to bed at approximately 12-1am, will get to sleep in seconds, will get out of bed at 6:30 am   she will get up several times at night to go to the bathroom or for unknown reasons  she does not nap during he day as she is at work  But she will fall asleep unintentionally at times  Follow in jerod    Review of Systems    Genitourinary need to urinate more than twice a night   Cardiology palpitations/fluttering feeling in the chest and ankle/leg swelling   Gastrointestinal none   Neurology frequent headaches, forgetfulness, loss of consciousness/blacking out and balance problems   Constitutional fatigue   Integumentary none   Psychiatry anxiety and depression   Musculoskeletal none   Pulmonary shortness of breath with activity, wheezing and snoring   ENT none   Endocrine none   Hematological none     Historical Information   Past Medical History:   Diagnosis Date    Asthma      Past Surgical History:   Procedure Laterality Date     SECTION  2010     Family History   Problem Relation Age of Onset    Cancer Mother     Heart disease Father      Social History     Social History    Marital status: Single     Spouse name: N/A    Number of children: N/A    Years of education: N/A     Occupational History    Not on file       Social History Main Topics    Smoking status: Current Every Day Smoker     Types: Cigarettes    Smokeless tobacco: Never Used      Comment: socially    Alcohol use No    Drug use: No    Sexual activity: Not on file     Other Topics Concern    Not on file     Social History Narrative    No narrative on file       Occupational History:     Meds/Allergies   No Known Allergies    Home medications:  Prior to Admission medications    Medication Sig Start Date End Date Taking? Authorizing Provider   albuterol (PROAIR HFA) 90 mcg/act inhaler Inhale 2 puffs every 4 (four) hours as needed 8/30/17  Yes Historical Provider, MD   Butalbital-APAP-Caffeine -40 MG CAPS Take by mouth every 4 (four) hours 6/6/17  Yes Historical Provider, MD   fluticasone (FLONASE) 50 mcg/act nasal spray 2 sprays into each nostril daily 8/11/17  Yes Historical Provider, MD   ondansetron (ZOFRAN-ODT) 4 mg disintegrating tablet Take 1 tablet by mouth every 8 (eight) hours as needed 8/30/17  Yes Historical Provider, MD       Vitals:   Blood pressure 142/88, pulse 92, temperature (!) 97 3 °F (36 3 °C), temperature source Tympanic, height 5' 4" (1 626 m), weight 118 kg (260 lb 8 oz), SpO2 98 % , RA, Body mass index is 44 71 kg/m²  Physical Exam  General: Obese, Awake alert and oriented x 3, conversant without conversational dyspnea, NAD, normal affect, somnolent  HEENT:  PERRL, Sclera noninjected, nonicteric OU, Nares patent,  no craniofacial abnormalities, Mucous membranes, moist, no oral lesions, normal dentition, Mallampati class 4  NECK: Trachea midline, no accessory muscle use, no stridor, no cervical or supraclavicular adenopathy, JVP not elevated  CARDIAC: Reg, single s1/S2, no m/r/g  PULM: CTA bilaterally no wheezing, rhonchi or rales  ABD: Normoactive bowel sounds, soft nontender, nondistended, no rebound, no rigidity, no guarding  EXT: No cyanosis, no clubbing, no edema, normal capillary refill  NEURO: no focal neurologic deficits, AAOx3, moving all extremities appropriately    Labs: I have personally reviewed pertinent lab results    Lab Results   Component Value Date    WBC 8 0 01/09/2018    HGB 12 9 01/09/2018    HCT 39 1 01/09/2018    MCV 84 01/09/2018     01/09/2018      Lab Results   Component Value Date    GLUCOSE 96 01/09/2018    CALCIUM 9 5 01/09/2018     01/09/2018    K 4 2 01/09/2018    CO2 20 01/09/2018     01/09/2018    BUN 15 01/09/2018    CREATININE 0 68 01/09/2018     Sleep studies:  Home study:     Mrs Marck Juarez had sleep related respiratory events, AHI - 113 7, more of which were obstructive  This would be consistent with severe obstructive sleep apnea  However, there were several central or mixed events as well which may represent complex sleep apnea  Therefore, I recommend an in lab CPAP titration  If central events persist or worsen, may need to consider adaptive servo ventilation if has normal systolic heart function  Titration:  Mrs Marck Juarez slept very poorly with total sleep time of just 163 minutes and sleep efficiency of just 39 6%  However, when she did sleep she had early REM onset and increased REM density  This could have bene related to severe sleep deprivation from untreated sleep related respiratory events  Narcolepsy is very unlikely given delayed sleep onset but can not be ruled out given early REM onset  Through the titration she failed both CPAP and BIPAP with treatment emergent central events  This grew worse on BiPAP  Therefore, in conjunction with a normal EF on echocardiogram from January 2018, I recommend an ASV titration study       DO [facility] Sleep Physician

## 2018-07-16 ENCOUNTER — OFFICE VISIT (OUTPATIENT)
Dept: FAMILY MEDICINE CLINIC | Facility: CLINIC | Age: 38
End: 2018-07-16
Payer: COMMERCIAL

## 2018-07-16 VITALS
DIASTOLIC BLOOD PRESSURE: 74 MMHG | HEIGHT: 64 IN | TEMPERATURE: 98.1 F | SYSTOLIC BLOOD PRESSURE: 122 MMHG | OXYGEN SATURATION: 99 % | WEIGHT: 262.25 LBS | BODY MASS INDEX: 44.77 KG/M2 | HEART RATE: 91 BPM

## 2018-07-16 DIAGNOSIS — H00.015 HORDEOLUM EXTERNUM OF LEFT LOWER EYELID: ICD-10-CM

## 2018-07-16 DIAGNOSIS — H00.015 HORDEOLUM EXTERNUM OF LEFT LOWER EYELID: Primary | ICD-10-CM

## 2018-07-16 PROCEDURE — 3008F BODY MASS INDEX DOCD: CPT | Performed by: FAMILY MEDICINE

## 2018-07-16 PROCEDURE — 99213 OFFICE O/P EST LOW 20 MIN: CPT | Performed by: FAMILY MEDICINE

## 2018-07-16 RX ORDER — ERYTHROMYCIN 5 MG/G
0.5 OINTMENT OPHTHALMIC EVERY 6 HOURS SCHEDULED
Qty: 3.5 G | Refills: 0 | Status: SHIPPED | OUTPATIENT
Start: 2018-07-16

## 2018-07-16 RX ORDER — ERYTHROMYCIN 5 MG/G
0.5 OINTMENT OPHTHALMIC EVERY 6 HOURS SCHEDULED
Qty: 3.5 G | Refills: 0 | Status: SHIPPED | OUTPATIENT
Start: 2018-07-16 | End: 2018-07-16 | Stop reason: SDUPTHER

## 2018-07-16 NOTE — PATIENT INSTRUCTIONS
Stye   WHAT YOU NEED TO KNOW:   A stye is a lump on the edge or inside of your eyelid caused by an infection  A stye can form on your upper or lower eyelid  It usually goes away in 2 to 4 days  DISCHARGE INSTRUCTIONS:   Medicines:   · Antibiotic medicine: This is given as an ointment to put into your eye  It is used to fight an infection caused by bacteria  Use as directed  · Take your medicine as directed  Contact your healthcare provider if you think your medicine is not helping or if you have side effects  Tell him of her if you are allergic to any medicine  Keep a list of the medicines, vitamins, and herbs you take  Include the amounts, and when and why you take them  Bring the list or the pill bottles to follow-up visits  Carry your medicine list with you in case of an emergency  Follow up with your healthcare provider as directed:  Write down your questions so you remember to ask them during your visits  Self-care:   · Use warm compresses: This will help decrease swelling and pain  Wet a clean washcloth with warm water and place it on your eye for 10 to 15 minutes, 3 to 4 times each day or as directed  · Keep your hands away from your eye: This helps to prevent the spread of the infection to other parts of the eye  Wash your hands often with soap and dry with a clean towel  Do not squeeze the stye  · Do not use eye makeup:  Do not wear eye makeup while you have a stye  Eye makeup may carry bacteria and cause another stye  Throw away eye makeup and brushes used to apply the makeup  Use new eye makeup after the stye has gone away  Do not share eye makeup with others  · Prevent another stye:  Wash your face and clean your eyelashes every day  Remove eye makeup with makeup remover  This helps to completely remove eye makeup without heavy rubbing  Contact your healthcare provider if:   · You have redness and discharge around your eye, and your eye pain is getting worse      · Your vision changes  · The stye has not gone away within 7 days  · The stye comes back within a short period of time after treatment  · You have questions or concerns about your condition or care  © 2017 [address] [employer] Information is for End User's use only and may not be sold, redistributed or otherwise used for commercial purposes  All illustrations and images included in CareNotes® are the copyrighted property of [employer]  or Constantine Contreras  The above information is an  only  It is not intended as medical advice for individual conditions or treatments  Talk to your doctor, nurse or pharmacist before following any medical regimen to see if it is safe and effective for you

## 2018-07-16 NOTE — PROGRESS NOTES
Assessment/Plan:    No problem-specific Assessment & Plan notes found for this encounter  There are no diagnoses linked to this encounter  Subjective:   Chief Complaint   Patient presents with    sty     pt noticed a sty on her lt eye since Friday, that is getting bigger        Patient ID: Nolberto Gifford is a 41 y o  female  The patient is here because she has had a stye for about 4 days  It is on the lower left leg  It is tender  It is not getting any better  She tried something over-the-counter that did not work  She does not have any drainage from the eye  No fevers or chills        The following portions of the patient's history were reviewed and updated as appropriate: allergies, current medications, past family history, past medical history, past social history, past surgical history and problem list     Review of Systems      Objective:  Vitals:    07/16/18 1034   BP: 122/74   Pulse: 91   Temp: 98 1 °F (36 7 °C)   SpO2: 99%   Weight: 119 kg (262 lb 4 oz)   Height: 5' 4" (1 626 m)      Physical Exam   Constitutional: She appears well-developed and well-nourished  No distress  Eyes: Left eye exhibits hordeolum (lower lid)  Skin: She is not diaphoretic